# Patient Record
Sex: MALE | Race: WHITE | Employment: FULL TIME | URBAN - METROPOLITAN AREA
[De-identification: names, ages, dates, MRNs, and addresses within clinical notes are randomized per-mention and may not be internally consistent; named-entity substitution may affect disease eponyms.]

---

## 2017-03-07 ENCOUNTER — AMBULATORY - HEALTHEAST (OUTPATIENT)
Dept: ADMINISTRATIVE | Facility: REHABILITATION | Age: 62
End: 2017-03-07

## 2017-03-07 DIAGNOSIS — R42 LIGHTHEADEDNESS: ICD-10-CM

## 2017-03-07 DIAGNOSIS — H61.23 IMPACTED CERUMEN OF BOTH EARS: ICD-10-CM

## 2017-03-07 DIAGNOSIS — R11.0 NAUSEA: ICD-10-CM

## 2020-02-15 ENCOUNTER — HOSPITAL ENCOUNTER (EMERGENCY)
Facility: CLINIC | Age: 65
Discharge: HOME | End: 2020-02-15
Attending: PHYSICIAN ASSISTANT | Admitting: PHYSICIAN ASSISTANT

## 2020-02-15 VITALS
TEMPERATURE: 98.3 F | RESPIRATION RATE: 8 BRPM | DIASTOLIC BLOOD PRESSURE: 95 MMHG | HEART RATE: 72 BPM | OXYGEN SATURATION: 94 % | SYSTOLIC BLOOD PRESSURE: 140 MMHG

## 2020-02-15 DIAGNOSIS — R53.83 FATIGUE: ICD-10-CM

## 2020-02-15 DIAGNOSIS — I10 BENIGN ESSENTIAL HYPERTENSION: ICD-10-CM

## 2020-02-15 LAB
ANION GAP SERPL CALCULATED.3IONS-SCNC: 5 MMOL/L (ref 3–14)
BASOPHILS # BLD AUTO: 0.1 10E9/L (ref 0–0.2)
BASOPHILS NFR BLD AUTO: 0.9 %
BUN SERPL-MCNC: 22 MG/DL (ref 7–30)
CALCIUM SERPL-MCNC: 9.1 MG/DL (ref 8.5–10.1)
CHLORIDE SERPL-SCNC: 108 MMOL/L (ref 94–109)
CO2 SERPL-SCNC: 25 MMOL/L (ref 20–32)
CREAT SERPL-MCNC: 1.33 MG/DL (ref 0.66–1.25)
DIFFERENTIAL METHOD BLD: NORMAL
EOSINOPHIL # BLD AUTO: 0.3 10E9/L (ref 0–0.7)
EOSINOPHIL NFR BLD AUTO: 4.2 %
ERYTHROCYTE [DISTWIDTH] IN BLOOD BY AUTOMATED COUNT: 12.8 % (ref 10–15)
GFR SERPL CREATININE-BSD FRML MDRD: 56 ML/MIN/{1.73_M2}
GLUCOSE SERPL-MCNC: 110 MG/DL (ref 70–99)
HCT VFR BLD AUTO: 47.7 % (ref 40–53)
HGB BLD-MCNC: 16 G/DL (ref 13.3–17.7)
IMM GRANULOCYTES # BLD: 0 10E9/L (ref 0–0.4)
IMM GRANULOCYTES NFR BLD: 0.4 %
LYMPHOCYTES # BLD AUTO: 1.4 10E9/L (ref 0.8–5.3)
LYMPHOCYTES NFR BLD AUTO: 18.8 %
MCH RBC QN AUTO: 31.1 PG (ref 26.5–33)
MCHC RBC AUTO-ENTMCNC: 33.5 G/DL (ref 31.5–36.5)
MCV RBC AUTO: 93 FL (ref 78–100)
MONOCYTES # BLD AUTO: 0.8 10E9/L (ref 0–1.3)
MONOCYTES NFR BLD AUTO: 10.9 %
NEUTROPHILS # BLD AUTO: 4.8 10E9/L (ref 1.6–8.3)
NEUTROPHILS NFR BLD AUTO: 64.8 %
NRBC # BLD AUTO: 0 10*3/UL
NRBC BLD AUTO-RTO: 0 /100
PLATELET # BLD AUTO: 214 10E9/L (ref 150–450)
PLATELET # BLD EST: NORMAL 10*3/UL
POTASSIUM SERPL-SCNC: 3.9 MMOL/L (ref 3.4–5.3)
RBC # BLD AUTO: 5.15 10E12/L (ref 4.4–5.9)
RBC MORPH BLD: NORMAL
SODIUM SERPL-SCNC: 138 MMOL/L (ref 133–144)
TROPONIN I SERPL-MCNC: <0.015 UG/L (ref 0–0.04)
TSH SERPL DL<=0.005 MIU/L-ACNC: 1.71 MU/L (ref 0.4–4)
WBC # BLD AUTO: 7.4 10E9/L (ref 4–11)

## 2020-02-15 PROCEDURE — 85025 COMPLETE CBC W/AUTO DIFF WBC: CPT | Performed by: PHYSICIAN ASSISTANT

## 2020-02-15 PROCEDURE — 80048 BASIC METABOLIC PNL TOTAL CA: CPT | Performed by: PHYSICIAN ASSISTANT

## 2020-02-15 PROCEDURE — 84484 ASSAY OF TROPONIN QUANT: CPT | Performed by: PHYSICIAN ASSISTANT

## 2020-02-15 PROCEDURE — 84443 ASSAY THYROID STIM HORMONE: CPT | Performed by: PHYSICIAN ASSISTANT

## 2020-02-15 PROCEDURE — 36415 COLL VENOUS BLD VENIPUNCTURE: CPT | Performed by: PHYSICIAN ASSISTANT

## 2020-02-15 PROCEDURE — 93005 ELECTROCARDIOGRAM TRACING: CPT

## 2020-02-15 PROCEDURE — 99284 EMERGENCY DEPT VISIT MOD MDM: CPT

## 2020-02-15 RX ORDER — LOSARTAN POTASSIUM 50 MG/1
50 TABLET ORAL DAILY
COMMUNITY

## 2020-02-15 ASSESSMENT — ENCOUNTER SYMPTOMS
HEMATURIA: 0
NUMBNESS: 1
NAUSEA: 0
FATIGUE: 1
DYSURIA: 0
VOMITING: 0
FREQUENCY: 0
DIFFICULTY URINATING: 0
BACK PAIN: 0
SHORTNESS OF BREATH: 0
ABDOMINAL PAIN: 0

## 2020-02-15 NOTE — ED AVS SNAPSHOT
[facility] Emergency Department  [address]  [facility] [address]  Phone:  [phone]  Fax:  [phone]                                    Berry Garcia   MRN: [id number]    Department:  [facility] Emergency Department   Date of Visit:  2/15/2020           After Visit Summary Signature Page    I have received my discharge instructions, and my questions have been answered. I have discussed any challenges I see with this plan with the nurse or doctor.    ..........................................................................................................................................  Patient/Patient Representative Signature      ..........................................................................................................................................  Patient Representative Print Name and Relationship to Patient    ..................................................               ................................................  Date                                   Time    ..........................................................................................................................................  Reviewed by Signature/Title    ...................................................              ..............................................  Date                                               Time          22EPIC Rev 08/18

## 2020-02-16 LAB — INTERPRETATION ECG - MUSE: NORMAL

## 2020-02-16 ASSESSMENT — ENCOUNTER SYMPTOMS
WEAKNESS: 0
DIZZINESS: 0

## 2020-02-16 NOTE — ED PROVIDER NOTES
History     Chief Complaint:  Hypertension      HPI   Berry Garcia is a 64 year old male with a history of HTN who presents to the emergency department with his wife for evaluation of HTN. Patient reports having higher blood pressures since last night with readings between 130-188 systolic. Patient states he felt fatigued,  and had tlingling in his head. Patient currently takes losartan in the morning but states he occasionally skips his doses on the weekends. He has been taking them the past several days but did not take his dose today until 1700. He does this to try to stretch his medications further. He denies any pain, headache, neck pain, vertigo, shortness of breath, nausea, vomiting, urinary symptoms, vision changes, back pain, or increased stress.     Of note patient reports having CKD that is being managed with [facility] in [place].      Allergies:  No known drug allergies    Medications:    losartan     Past Medical History:    HTN    Past Surgical History:    The patient does not have any past pertinent medical history.    Family History:    History reviewed. No pertinent family history.     Social History:  Smoking status: never smoker  Alcohol use: no  Drug use: no  The patient presents to the emergency department with his wife.  Marital Status:       Review of Systems   Constitutional: Positive for fatigue.   Eyes: Negative for visual disturbance.   Respiratory: Negative for shortness of breath.    Cardiovascular: Negative for chest pain.   Gastrointestinal: Negative for abdominal pain, nausea and vomiting.   Genitourinary: Negative for decreased urine volume, difficulty urinating, dysuria, frequency, hematuria and urgency.   Musculoskeletal: Negative for back pain.   Neurological: Positive for numbness. Negative for dizziness, syncope and weakness.   All other systems reviewed and are negative.    Physical Exam     Patient Vitals for the past 24 hrs:   BP Temp Temp src  Pulse Heart Rate Resp SpO2   02/15/20 2100 (!) 140/95 -- -- 72 69 8 94 %   02/15/20 2045 (!) 147/99 -- -- 77 80 8 95 %   02/15/20 2030 (!) 133/109 -- -- 75 77 11 95 %   02/15/20 2015 (!) 152/98 -- -- 82 81 -- 96 %   02/15/20 2000 (!) 164/105 -- -- 79 85 8 97 %   02/15/20 1945 (!) 149/98 -- -- 80 85 -- 97 %   02/15/20 1930 (!) 149/106 -- -- 80 78 13 97 %   02/15/20 1915 (!) 148/98 -- -- -- -- -- --   02/15/20 1858 (!) 173/109 98.3  F (36.8  C) Temporal 101 -- 18 100 %       Physical Exam  General: Alert and cooperative with exam. Resting comfortably on gurney  Head:  Scalp is NC/AT  Eyes:  No scleral icterus, PERRL  ENT:  The external nose and ears are normal.   Neck:  Normal range of motion without rigidity.  CV:  Regular rate and rhythm    No pathologic murmur, rubs, or gallops.  Resp:  Breath sounds are clear bilaterally.  No crackles, wheezes, rhonchi, stridor.    Non-labored, no retractions or accessory muscle use  GI:  Abdomen is soft, no distension, no tenderness, no masses. No peritoneal signs.  Bowel sounds present in all quadrants  :  No suprapubic or flank tenderness  MS:  No lower extremity edema or asymmetric calf swelling.  Skin:  Warm and dry, No rash or lesions noted.  Neuro: Oriented. No gross motor deficits.    Strength and sensation grossly intact in all 4 extremities.  Cranial nerves 2-12 intact. GCS: 15. Normal finger to nose and heel to shin testing.  Gait normal  Psych: Awake. Alert. Normal affect. Appropriate interactions.    Emergency Department Course   ECG (19:29:47):  Rate 78 bpm. NC interval 186. QRS duration 98. QT/QTc 350/399. P-R-T axes 57 39 56. Normal sinus rhythm. Normal ECG. Interpreted at 1939 by Sánchez Pop PA.    Laboratory:  CBC: WBC: 7.4, HGB: 16.0, PLT: 214  BMP: Glucose 110 (H), GFR 56 (L), o/w WNL (Creatinine: 1.33 (H))  2039 Troponin: <0.015  TSH with free T4 reflex: 1.71    Emergency Department Course:  Nursing notes and vitals reviewed. (1905) I performed  an exam of the patient as documented above.     Blood drawn. This was sent to the lab for further testing, results above.     An EKG was recorded. Results as noted above.      I rechecked the patient and discussed the results of his workup thus far.     Findings and plan explained to the Patient. Patient discharged home with instructions regarding supportive care, medications, and reasons to return. The importance of close follow-up was reviewed.     I personally reviewed the laboratory results with the Patient and answered all related questions prior to discharge.     Impression & Plan    Medical Decision Makin-year-old male presents with concern of elevated blood pressure reading.  Patient history and records reviewed.  He denies any symptoms other than fatigue.  Work-up here is reassuring with normal EKG, negative troponin, mildly elevated creatinine, though at baseline per patient, normal TSH, hemoglobin, white blood cell count, platelets, glucose, electrolytes.  There is no evidence of hypertensive crisis such as ACS, stroke, intracranial bleed, hypertensive encephalopathy, aortic dissection, acute CHF, acute renal failure etc.  His blood pressure is markedly improved on recheck, likely secondary to his medication kicking in his he did not take it this morning as he was supposed to.  He is completely asymptomatic other than feeling slightly more tired than usual, and noting some tingling of the scalp but has a completely normal neurologic exam with no focal deficits and denies any headache or other symptoms.  Plan will be follow-up with primary care provider for recheck in 2 to 3 days and return precautions for the development of any new or worsening symptoms.    Diagnosis:    ICD-10-CM    1. Fatigue R53.83    2. Benign essential hypertension I10        Disposition:  discharged to home  Ry Hendricks  2/15/2020   [facility] EMERGENCY DEPARTMENT  Scribe Disclosure:  AYDEN, Ry Hendricks, am serving  as a scribe at 7:05 PM on 2/15/2020 to document services personally performed by Sánchez Pop PA based on my observations and the provider's statements to me.        Sánchez Pop PA-C  02/16/20 0127

## 2020-02-16 NOTE — ED TRIAGE NOTES
ABCs intact. Pt hx htn. Pt c/o BP elevated since last night. Pt states BP has been ranging from 136-188 at home. Denies CP, SOB. C/o lightheadedness, fatigued.